# Patient Record
Sex: MALE | Race: BLACK OR AFRICAN AMERICAN | Employment: OTHER | URBAN - METROPOLITAN AREA
[De-identification: names, ages, dates, MRNs, and addresses within clinical notes are randomized per-mention and may not be internally consistent; named-entity substitution may affect disease eponyms.]

---

## 2017-01-01 ENCOUNTER — OFFICE VISIT (OUTPATIENT)
Dept: SLEEP MEDICINE | Age: 62
End: 2017-01-01

## 2017-01-01 ENCOUNTER — DOCUMENTATION ONLY (OUTPATIENT)
Dept: SLEEP MEDICINE | Age: 62
End: 2017-01-01

## 2017-01-01 ENCOUNTER — TELEPHONE (OUTPATIENT)
Dept: INTERNAL MEDICINE CLINIC | Age: 62
End: 2017-01-01

## 2017-01-01 ENCOUNTER — OFFICE VISIT (OUTPATIENT)
Dept: INTERNAL MEDICINE CLINIC | Age: 62
End: 2017-01-01

## 2017-01-01 ENCOUNTER — OFFICE VISIT (OUTPATIENT)
Dept: ENDOCRINOLOGY | Age: 62
End: 2017-01-01

## 2017-01-01 ENCOUNTER — TELEPHONE (OUTPATIENT)
Dept: ENDOCRINOLOGY | Age: 62
End: 2017-01-01

## 2017-01-01 VITALS
BODY MASS INDEX: 37.37 KG/M2 | TEMPERATURE: 98.6 F | OXYGEN SATURATION: 96 % | DIASTOLIC BLOOD PRESSURE: 70 MMHG | SYSTOLIC BLOOD PRESSURE: 147 MMHG | HEART RATE: 74 BPM | HEIGHT: 70 IN | WEIGHT: 261 LBS

## 2017-01-01 VITALS
OXYGEN SATURATION: 93 % | WEIGHT: 258.6 LBS | RESPIRATION RATE: 16 BRPM | HEART RATE: 83 BPM | HEIGHT: 70 IN | SYSTOLIC BLOOD PRESSURE: 165 MMHG | DIASTOLIC BLOOD PRESSURE: 81 MMHG | TEMPERATURE: 98.1 F | BODY MASS INDEX: 37.02 KG/M2

## 2017-01-01 VITALS
DIASTOLIC BLOOD PRESSURE: 66 MMHG | HEART RATE: 81 BPM | WEIGHT: 262 LBS | SYSTOLIC BLOOD PRESSURE: 133 MMHG | BODY MASS INDEX: 37.51 KG/M2 | HEIGHT: 70 IN

## 2017-01-01 VITALS
DIASTOLIC BLOOD PRESSURE: 71 MMHG | HEART RATE: 81 BPM | BODY MASS INDEX: 37.37 KG/M2 | WEIGHT: 261 LBS | SYSTOLIC BLOOD PRESSURE: 159 MMHG | HEIGHT: 70 IN

## 2017-01-01 DIAGNOSIS — E78.5 DYSLIPIDEMIA: ICD-10-CM

## 2017-01-01 DIAGNOSIS — I10 HTN (HYPERTENSION), BENIGN: ICD-10-CM

## 2017-01-01 DIAGNOSIS — Z79.4 TYPE 2 DIABETES MELLITUS WITH COMPLICATION, WITH LONG-TERM CURRENT USE OF INSULIN (HCC): Primary | ICD-10-CM

## 2017-01-01 DIAGNOSIS — N18.30 CHRONIC KIDNEY DISEASE, STAGE III (MODERATE) (HCC): ICD-10-CM

## 2017-01-01 DIAGNOSIS — E66.9 OBESITY (BMI 30-39.9): ICD-10-CM

## 2017-01-01 DIAGNOSIS — E11.8 TYPE 2 DIABETES MELLITUS WITH COMPLICATION, WITH LONG-TERM CURRENT USE OF INSULIN (HCC): Primary | ICD-10-CM

## 2017-01-01 DIAGNOSIS — I67.9 CEREBRAL VASCULAR DISEASE: ICD-10-CM

## 2017-01-01 DIAGNOSIS — I10 ESSENTIAL HYPERTENSION: Primary | ICD-10-CM

## 2017-01-01 DIAGNOSIS — E78.2 MIXED HYPERLIPIDEMIA: ICD-10-CM

## 2017-01-01 DIAGNOSIS — E11.9 DIABETES MELLITUS WITHOUT COMPLICATION (HCC): Primary | ICD-10-CM

## 2017-01-01 DIAGNOSIS — N18.4 CKD (CHRONIC KIDNEY DISEASE) STAGE 4, GFR 15-29 ML/MIN (HCC): ICD-10-CM

## 2017-01-01 DIAGNOSIS — G47.33 OSA (OBSTRUCTIVE SLEEP APNEA): Primary | ICD-10-CM

## 2017-01-01 RX ORDER — ATORVASTATIN CALCIUM 20 MG/1
TABLET, FILM COATED ORAL
Qty: 90 TAB | Refills: 3 | Status: SHIPPED | OUTPATIENT
Start: 2017-01-01

## 2017-01-01 RX ORDER — GLUCOSAM/CHONDRO/HERB 149/HYAL 750-100 MG
TABLET ORAL
COMMUNITY

## 2017-01-01 RX ORDER — CHLORTHALIDONE 25 MG/1
25 TABLET ORAL DAILY
Qty: 90 TAB | Refills: 3 | Status: SHIPPED | OUTPATIENT
Start: 2017-01-01

## 2017-01-01 RX ORDER — ACETAMINOPHEN AND CODEINE PHOSPHATE 300; 30 MG/1; MG/1
TABLET ORAL
Refills: 0 | COMMUNITY
Start: 2017-01-01

## 2017-01-01 RX ORDER — DOXAZOSIN 8 MG/1
TABLET ORAL DAILY
Refills: 4 | COMMUNITY
Start: 2017-01-01

## 2017-01-01 RX ORDER — LISINOPRIL 40 MG/1
40 TABLET ORAL DAILY
Qty: 90 TAB | Refills: 3 | Status: SHIPPED | OUTPATIENT
Start: 2017-01-01

## 2017-01-01 RX ORDER — CARVEDILOL 25 MG/1
25 TABLET ORAL 2 TIMES DAILY WITH MEALS
Qty: 180 TAB | Refills: 3 | Status: SHIPPED | OUTPATIENT
Start: 2017-01-01

## 2017-01-01 RX ORDER — CLINDAMYCIN HYDROCHLORIDE 300 MG/1
CAPSULE ORAL
Refills: 0 | COMMUNITY
Start: 2017-01-01

## 2017-01-01 RX ORDER — TORSEMIDE 20 MG/1
20 TABLET ORAL DAILY
Qty: 90 TAB | Refills: 3 | Status: SHIPPED | OUTPATIENT
Start: 2017-01-01

## 2017-01-01 RX ORDER — AMLODIPINE BESYLATE 10 MG/1
TABLET ORAL
Qty: 90 TAB | Refills: 2 | Status: SHIPPED | OUTPATIENT
Start: 2017-01-01

## 2017-01-01 RX ORDER — ASPIRIN AND DIPYRIDAMOLE 25; 200 MG/1; MG/1
CAPSULE, EXTENDED RELEASE ORAL
Qty: 180 CAP | Refills: 1 | Status: SHIPPED | OUTPATIENT
Start: 2017-01-01

## 2017-01-17 NOTE — MR AVS SNAPSHOT
Visit Information Date & Time Provider Department Dept. Phone Encounter #  
 1/17/2017  1:50 PM Zenaida Thibodeaux, [address] [facility] Diabetes and Endocrinology [phone] Follow-up Instructions Return in about 3 months (around 4/17/2017). Your Appointments 1/24/2017  2:45 PM  
ROUTINE CARE with MD AMARI Broussard [facility]) Appt Note: 4 month follow up; rcm,1/6/17 SC; pt r/s from 1/9/17 (inclement weather) [address] [phone]  
[address] [facility] [address] [place] [address] Upcoming Health Maintenance Date Due  
 MEDICARE YEARLY EXAM 12/8/1974 EYE EXAM RETINAL OR DILATED Q1 12/1/2015 INFLUENZA AGE 9 TO ADULT 8/1/2016 Pneumococcal 19-64 Highest Risk (2 of 3 - PPSV23) 11/3/2016 ZOSTER VACCINE AGE 60> 12/8/2016 FOOT EXAM Q1 2/22/2017 HEMOGLOBIN A1C Q6M 3/9/2017 LIPID PANEL Q1 11/30/2017 COLONOSCOPY 3/20/2022 DTaP/Tdap/Td series (3 - Td) 11/11/2025 Allergies as of 1/17/2017  Review Complete On: 1/17/2017 By: Zenaida Thibodeaux MD  
  
 Severity Noted Reaction Type Reactions Augmentin [Amoxicillin-pot Clavulanate]  07/08/2013    Hives Penicillins  02/21/2014    Rash Current Immunizations  Never Reviewed Name Date Influenza Vaccine 9/11/2015 Influenza Vaccine PF 9/12/2014 10:55 AM  
 Influenza Vaccine Split 10/26/2012 Pneumococcal Conjugate (PCV-13) 9/8/2016 TDAP Vaccine 10/26/2012 Tdap 11/11/2015 Not reviewed this visit You Were Diagnosed With   
  
 Codes Comments Diabetes mellitus without complication ([facility]     MGO-18-OU: E11.9 ICD-9-CM: 250.00 Vitals BP Pulse Height(growth percentile) Weight(growth percentile) BMI Smoking Status 133/66 81 5' 10\" (1.778 m) 262 lb (118.8 kg) 37.59 kg/m2 Current Every Day Smoker Vitals History BMI and BSA Data Body Mass Index Body Surface Area  
 37.59 kg/m 2 2.42 m 2 Preferred Pharmacy Pharmacy Name Phone [facility] [address] [place], [address] [phone] Your Updated Medication List  
  
   
This list is accurate as of: 1/17/17  2:41 PM.  Always use your most recent med list.  
  
  
  
  
 albuterol 90 mcg/actuation inhaler Commonly known as:  PROVENTIL HFA, VENTOLIN HFA, PROAIR HFA Take 2 Puffs by inhalation every four (4) hours as needed for Wheezing. amLODIPine 10 mg tablet Commonly known as:  Blanca Fraction Take 1 Tab by mouth daily. aspirin-dipyridamole  mg per SR capsule Commonly known as:  AGGRENOX Take 1 Cap by mouth two (2) times a day. atorvastatin 20 mg tablet Commonly known as:  LIPITOR  
TAKE 1 TABLET BY MOUTH DAILY  
  
 BD INSULIN SYRINGE ULTRA-FINE 1 mL 31 gauge x 5/16 Syrg Generic drug:  Insulin Syringe-Needle U-100 TO USE AS DIRECTED TWICE A DAY  
  
 carvedilol 25 mg tablet Commonly known as:  Maria Elena Spry Take 1 Tab by mouth two (2) times daily (with meals). chlorthalidone 25 mg tablet Commonly known as:  Suzen Pay Take 1 Tab by mouth daily. clotrimazole-betamethasone topical cream  
Commonly known as:  Hope Lipa Apply  to affected area two (2) times a day. cyclobenzaprine 10 mg tablet Commonly known as:  FLEXERIL Take 1 Tab by mouth three (3) times daily as needed for Muscle Spasm(s). doxazosin 4 mg tablet Commonly known as:  CARDURA Take 1 Tab by mouth nightly. For blood pressure  
  
 insulin NPH/insulin regular 100 unit/mL (70-30) injection Commonly known as:  HumuLIN 70/30 INJECT 65 UNITS SUBCUTANEOUSLY WITH BREAKFAST, 45 UNITS WITH LUNCH, 65 UNITS WITH DINNER + ADDITIONAL FOR HIGH GLUCOSES- UP  UNITS/DAY lisinopril 40 mg tablet Commonly known as:  Lam Paradise Take 1 Tab by mouth daily. multivitamin tablet Commonly known as:  ONE A DAY Take 1 Tab by mouth daily. Kajal Pen Needle 32 gauge x 5/32\" Ndle Generic drug:  Insulin Needles (Disposable) USE TO INJECT DAILY  
  
 ONETOUCH ULTRA TEST strip Generic drug:  glucose blood VI test strips USE TO TEST BLOOD SUGAR THREE TIMES A DAY AS DIRECTED  
  
 torsemide 20 mg tablet Commonly known as:  DEMADEX Take 1 Tab by mouth daily. For blood pressure and fluid VITAMIN B-12 500 mcg tablet Generic drug:  cyanocobalamin Take 500 mcg by mouth daily. Prescriptions Sent to Pharmacy Refills  
 insulin NPH/insulin regular (HUMULIN 70/30) 100 unit/mL (70-30) injection 10 Sig: INJECT 65 UNITS SUBCUTANEOUSLY WITH BREAKFAST, 45 UNITS WITH LUNCH, 65 UNITS WITH DINNER + ADDITIONAL FOR HIGH GLUCOSES- UP  UNITS/DAY Class: Normal  
 Pharmacy: [address] [place] [address], [address]  #: [phone] Follow-up Instructions Return in about 3 months (around 4/17/2017). Patient Instructions Diabetes Avoid added sugars from you drink Decrease intake of bread, pasta, rice, potatoes ** Before you snack, check glucose. Novolog 70/30 
65 units with breakfast , lunch: 45 units, PM dose  65 units **Adjusting Novolog 70/30 for glucoses < 100 - take 10 units less 201-250: add 5 units 251-300: add 10 units 301-350 :add 15 units 351 +    : add 20 units Check blood sugars before meals and at bedtime Goals for blood sugars: 
Fasting  (less than 150) Other times -  (less than 180). Blood pressure: 
Continue chlorthalidone 25 mg, lisinopril 40 mg, amlodipine to 5 mg daily, carvedilol to 25 mg twice daily, torsemide, doxazosin 8 mg. Cholesterol:  
Continue atorvastatin 40 mg Introducing [facility]! Dear Bhavani Alegre: Thank you for requesting a [employer] account.   Our records indicate that you have previously registered for a [employer] account but its currently inactive. Please call our [employer] support line at [phone]. Additional Information If you have questions, please visit the Frequently Asked Questions section of the [employer] website at [email or web address] Remember, [employer] is NOT to be used for urgent needs. For medical emergencies, dial 911. Now available from your iPhone and Android! Please provide this summary of care documentation to your next provider. Your primary care clinician is listed as [place]. If you have any questions after today's visit, please call [phone].

## 2017-01-17 NOTE — PROGRESS NOTES
History of Present Illness: Binta Jenkins is a 62 y.o. male presents for follow-up of diabetes. He has had diabetes for over 20 years. He also has HTN, dyslipidemia, PAD (s/p bypass in right leg, stent in lower leg in 2016) and CKD  Since last visit he has started seeing Lisbeth Kirkpatrick for primary care and was referred to nephrology    Diabetes related complications:  + retinopathy - severe, + nephropathy - 2-4 g+ protein, + neuropathy     Current diabetes regimen:   Novolog 70/30 -  50, 35, 55  with breakfast, lunch and supper. Additional added for hyperglycemia. 5:50 for values > 201    Glucoses:  Brings log. Checking before meals. Glucoses are largely in 200s and 300s. No recent lows. Had 1-2 values in 80s-90s. Diet:   Eating out often - may have snacks (chips,oreos, soda) at home buying class. For meals - fish sandwich or other fast food restaurants. - reports snacking before lunch and dinner if he feels 'funny' and suspects his glucose may be low    HTN: taking amlodipine 10 mg, carvedilol 25 mg BID , chlorthalidone 25, lisinopril 40, torsemide, doxazosin 8 mg.      Dyslipidemia - atorvastatin 20 mg    URBAN -using machine    Past Medical History   Diagnosis Date    Chest pain 2011     Negative stress test by Dr. Gabrielle Cunningham Chronic kidney disease (CKD), stage III (moderate)     Cortical senile cataract 10/26/2012    Degenerative disc disease, cervical 10/26/2012    Diabetes ([address] 1993     type II    Diabetic macular edema(362.07) 10/26/2012    Diabetic retinopathy of right eye ([address]      Dr. 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 Ginna Arboleda  Proteinuria 10/26/2012    Severe nonproliferative diabetic retinopathy(362.06) 10/26/2012    Shingles 2011    Stroke [facility]) 2004     left sided weakness    Unspecified sleep apnea      On cpap; Follows with Dr. Callie Tran     Current Outpatient Prescriptions   Medication Sig    amLODIPine (NORVASC) 10 mg tablet Take 1 Tab by mouth daily.  aspirin-dipyridamole (AGGRENOX)  mg per SR capsule Take 1 Cap by mouth two (2) times a day.  doxazosin (CARDURA) 4 mg tablet Take 1 Tab by mouth nightly. For blood pressure    torsemide (DEMADEX) 20 mg tablet Take 1 Tab by mouth daily. For blood pressure and fluid    albuterol (PROVENTIL HFA, VENTOLIN HFA, PROAIR HFA) 90 mcg/actuation inhaler Take 2 Puffs by inhalation every four (4) hours as needed for Wheezing.  cyclobenzaprine (FLEXERIL) 10 mg tablet Take 1 Tab by mouth three (3) times daily as needed for Muscle Spasm(s).  atorvastatin (LIPITOR) 20 mg tablet TAKE 1 TABLET BY MOUTH DAILY    BD INSULIN SYRINGE ULTRA-FINE 1 mL 31 gauge x 5/16 syrg TO USE AS DIRECTED TWICE A DAY    insulin NPH/insulin regular (HUMULIN 70/30) 100 unit/mL (70-30) injection INJECT 60 UNITS SUBCUTANEOUSLY WITH BREAKFAST, 40 UNITS WITH LUNCH AND 50 UNITS WITH SUPPER (Patient taking differently: INJECT 55 UNITS SUBCUTANEOUSLY WITH BREAKFAST, 35 UNITS WITH LUNCH AND 55 UNITS WITH SUPPER)    carvedilol (COREG) 25 mg tablet Take 1 Tab by mouth two (2) times daily (with meals).  chlorthalidone (HYGROTEN) 25 mg tablet Take 1 Tab by mouth daily.  lisinopril (PRINIVIL, ZESTRIL) 40 mg tablet Take 1 Tab by mouth daily.  TATI PEN NEEDLE 32 gauge x 5/32\" ndle USE TO INJECT DAILY    clotrimazole-betamethasone (LOTRISONE) topical cream Apply  to affected area two (2) times a day.  ONE TOUCH ULTRA TEST strip USE TO TEST BLOOD SUGAR THREE TIMES A DAY AS DIRECTED    multivitamin (ONE A DAY) tablet Take 1 Tab by mouth daily.     cyanocobalamin (VITAMIN B-12) 500 mcg tablet Take 500 mcg by mouth daily. No current facility-administered medications for this visit. Allergies   Allergen Reactions    Augmentin [Amoxicillin-Pot Clavulanate] Hives    Penicillins Rash       Review of Systems:  - Eyes: no blurry vision or double vision  - Cardiovascular: no chest pain  - Respiratory: no shortness of breath  - Musculoskeletal: no myalgias  - Neurological: stable    Physical Examination:  Visit Vitals    /66    Pulse 81    Ht 5' 10\" (1.778 m)    Wt 262 lb (118.8 kg)    BMI 37.59 kg/m2   -   - General: pleasant, no distress, normal gait   HEENT: hearing intact, EOMI, clear sclera without icterus  - Cardiovascular: regular, normal rate   - Respiratory: normal effort  - Integumentary: no edema  - Psychiatric: normal mood and affect    Data Reviewed:     Component      Latest Ref Rng & Units 9/9/2016 9/9/2016 9/9/2016           8:48 AM  8:48 AM  8:48 AM   Glucose      65 - 99 mg/dL   190 (H)   BUN      6 - 24 mg/dL   34 (H)   Creatinine      0.76 - 1.27 mg/dL   2.72 (H)   GFR est non-AA      >59 mL/min/1.73   24 (L)   GFR est AA      >59 mL/min/1.73   28 (L)   BUN/Creatinine ratio      9 - 20   13   Sodium      134 - 144 mmol/L   141   Potassium      3.5 - 5.2 mmol/L   4.7   Chloride      97 - 108 mmol/L   103   CO2      18 - 29 mmol/L   20   Calcium      8.7 - 10.2 mg/dL   8.6 (L)   Protein, total      6.0 - 8.5 g/dL   6.1   Albumin      3.5 - 5.5 g/dL   3.4 (L)   GLOBULIN, TOTAL      1.5 - 4.5 g/dL   2.7   A-G Ratio      1.1 - 2.5   1.3   Bilirubin, total      0.0 - 1.2 mg/dL   0.2   Alk.  phosphatase      39 - 117 IU/L   101   AST      0 - 40 IU/L   14   ALT      0 - 44 IU/L   15   Cholesterol, total      100 - 199 mg/dL  135    Triglyceride      0 - 149 mg/dL  200 (H)    HDL Cholesterol      >39 mg/dL  30 (L)    VLDL, calculated      5 - 40 mg/dL  40    LDL, calculated      0 - 99 mg/dL  65    Hemoglobin A1c, (calculated)      4.8 - 5.6 % 11.2 (H)     Estimated average glucose mg/dL 275       Assessment/Plan:   1. Diabetes mellitus without complication ([address]   - needs more insulin and needs to make dietary changes as well. See below. - Increase each dose by 10 units -> 65/45/65 units with meals. Continue adding 5:50 for highs.   - he is snacking between meals if he feels 'funny'. Recommend he check glucoses in these instances so we can see glucose and see how you respond to treatment. Glucose in office was 317 when he felt 'funny' and was going to eat. 2. HTN (hypertension), benign   Continue chlorthalidone 25 mg, lisinopril 40 mg, amlodipine to 5 mg daily, carvedilol to 25 mg twice daily, torsemide, doxazosin 8 mg. 3. CKD (chronic kidney disease) stage 4, GFR 15-29 ml/min ([facility])   - reviewed importance of BP and glucose control to avoid further progression of kidney function decline.   - needs to make some dietary improvements. Recommended he increase vegetable intake and decrease carb and sweet intake. Greater than 50% of 25 minute visit was spent counseling the patient about above. Patient Instructions   Diabetes  Avoid added sugars from you drink  Decrease intake of bread, pasta, rice, potatoes  ** Before you snack, check glucose. Novolog 70/30  65 units with breakfast , lunch: 45 units, PM dose  65 units    **Adjusting Novolog 70/30 for glucoses    < 100 - take 10 units less  201-250: add 5 units  251-300: add 10 units  301-350 :add 15 units  351 +    : add 20 units    Check blood sugars before meals and at bedtime    Goals for blood sugars:  Fasting  (less than 150)  Other times -  (less than 180). Blood pressure:  Continue chlorthalidone 25 mg, lisinopril 40 mg, amlodipine to 5 mg daily, carvedilol to 25 mg twice daily, torsemide, doxazosin 8 mg. Cholesterol:   Continue atorvastatin 40 mg      Follow-up Disposition:  Return in about 3 months (around 4/17/2017).     Copy sent to:  lexi

## 2017-01-17 NOTE — PATIENT INSTRUCTIONS
Diabetes  Avoid added sugars from you drink  Decrease intake of bread, pasta, rice, potatoes  ** Before you snack, check glucose. Novolog 70/30  65 units with breakfast , lunch: 45 units, PM dose  65 units    **Adjusting Novolog 70/30 for glucoses    < 100 - take 10 units less  201-250: add 5 units  251-300: add 10 units  301-350 :add 15 units  351 +    : add 20 units    Check blood sugars before meals and at bedtime    Goals for blood sugars:  Fasting  (less than 150)  Other times -  (less than 180). Blood pressure:  Continue chlorthalidone 25 mg, lisinopril 40 mg, amlodipine to 5 mg daily, carvedilol to 25 mg twice daily, torsemide, doxazosin 8 mg.       Cholesterol:   Continue atorvastatin 40 mg

## 2017-01-24 NOTE — PATIENT INSTRUCTIONS
Learning About Healthy Weight  What is a healthy weight? A healthy weight is the weight at which you feel good about yourself and have energy for work and play. It's also one that lowers your risk for health problems. What can you do to stay at a healthy weight? It can be hard to stay at a healthy weight, especially when fast food, vending-machine snacks, and processed foods are so easy to find. And with your busy lifestyle, activity may be low on your list of things to do. But staying at a healthy weight may be easier than you think. Here are some dos and don'ts for staying at a healthy weight:  Do eat healthy foods  The kinds of foods you eat have a big impact on both your weight and your health. Reaching and staying at a healthy weight is not about going on a diet. It's about making healthier food choices every day and changing your diet for good. Healthy eating means eating a variety of foods so that you get all the nutrients you need. Your body needs protein, carbohydrate, and fats for energy. They keep your heart beating, your brain active, and your muscles working. On most days, try to eat from each food group. This means eating a variety of:  · Whole grains, such as whole wheat breads and pastas. · Fruits and vegetables. · Dairy products, such as low-fat milk, yogurt, and cheese. · Lean proteins, such as all types of fish, chicken without the skin, and beans. Don't have too much or too little of one thing. All foods, if eaten in moderation, can be part of healthy eating. Even sweets can be okay. If your favorite foods are high in fat, salt, sugar, or calories, limit how often you eat them. Eat smaller servings, or look for healthy substitutes. Do watch what you eat  Many people eat more than their bodies need. Part of staying at a healthy weight means learning how much food you really need from day to day and not eating more than that.  Even with healthy foods, eating too much can make you gain weight. Having a well-balanced diet means that you eat enough, but not too much, and that your food gives you the nutrients you need to stay healthy. So listen to your body. Eat when you're hungry. Stop when you feel satisfied. It's a good idea to have healthy snacks ready for when you get hungry. Keep healthy snacks with you at work, in your car, and at home. If you have a healthy snack easily available, you'll be less likely to pick a candy bar or bag of chips from a vending machine instead. Some healthy snacks you might want to keep on hand are fruit, low-fat yogurt, string cheese, low-fat microwave popcorn, raisins and other dried fruit, nuts, whole wheat crackers, pretzels, carrots, celery sticks, and broccoli. Do some physical activity  A big part of reaching and staying at a healthy weight is being active. When you're active, you burn calories. This makes it easier to reach and stay at a healthy weight. When you're active on a regular basis, your body burns more calories, even when you're at rest. Being active helps you lose fat and build lean muscle. Try to be active for at least 1 hour every day. This may sound like a lot, but it's okay to be active in smaller blocks of time that add up to 1 hour a day. Any activity that makes your heart beat faster and keeps it there for a while counts. A brisk walk, run, or swim will get your heart beating faster. So will climbing stairs, shooting baskets, or cycling. Even some household chores like vacuuming and mowing the lawn will get your heart rate up. Pick activities that you enjoyones that make your heart beat faster, your muscles stronger, and your muscles and joints more flexible. If you find more than one thing you like doing, do them all. You don't have to do the same thing every day. Don't diet  Diets don't work. Diets are temporary. Because you give up so much when you diet, you may be hungry and think about food all the time.  And after you stop dieting, you also may overeat to make up for what you missed. Most people who diet end up gaining back the pounds they lostand more. Remember that healthy bodies come in lots of shapes and sizes. Everyone can get healthier by eating better and being more active. Where can you learn more? Go to http://nuha-marshall.info/. Enter 704 1981 in the search box to learn more about \"Learning About Healthy Weight. \"  Current as of: February 16, 2016  Content Version: 11.1  © 4390-6849 [employer]. Care instructions adapted under license by [employer] (which disclaims liability or warranty for this information). If you have questions about a medical condition or this instruction, always ask your healthcare professional. [address] any warranty or liability for your use of this information.

## 2017-01-24 NOTE — MR AVS SNAPSHOT
Visit Information Date & Time Provider Department Dept. Phone Encounter #  
 1/24/2017  2:45 PM Prabhu Thibodeaux, [address] [place] [address],[address] [phone] [id number] Follow-up Instructions Return in about 6 months (around 7/24/2017) for HTN, etc.  
 Follow-up and Disposition History Your Appointments 4/4/2017  2:10 PM  
ROUTINE CARE with No Zabala MD  
[facility]) Appt Note: f/u diabetes cp0.00  
 [address] [place] [address]  
[address] [address] [address] [address] Upcoming Health Maintenance Date Due  
 MEDICARE YEARLY EXAM 12/8/1974 EYE EXAM RETINAL OR DILATED Q1 12/1/2015 Pneumococcal 19-64 Highest Risk (2 of 3 - PPSV23) 11/3/2016 ZOSTER VACCINE AGE 60> 12/8/2016 HEMOGLOBIN A1C Q6M 3/9/2017 LIPID PANEL Q1 11/30/2017 FOOT EXAM Q1 12/13/2017 COLONOSCOPY 3/20/2022 DTaP/Tdap/Td series (3 - Td) 11/11/2025 Allergies as of 1/24/2017  Review Complete On: 1/24/2017 By: Prabhu Thibodeaux MD  
  
 Severity Noted Reaction Type Reactions Augmentin [Amoxicillin-pot Clavulanate]  07/08/2013    Hives Penicillins  02/21/2014    Rash Current Immunizations  Reviewed on 1/24/2017 Name Date Influenza Vaccine 11/15/2016, 9/11/2015 Influenza Vaccine PF 9/12/2014 10:55 AM  
 Influenza Vaccine Split 10/26/2012 Pneumococcal Conjugate (PCV-13) 9/8/2016 TDAP Vaccine 10/26/2012 Tdap 11/11/2015 Reviewed by Prabhu Thibodeaux MD on 1/24/2017 at  3:27 PM  
You Were Diagnosed With   
  
 Codes Comments Essential hypertension    -  Primary ICD-10-CM: I10 
ICD-9-CM: 401.9 Mixed hyperlipidemia     ICD-10-CM: E78.2 ICD-9-CM: 272.2 Cerebral vascular disease     ICD-10-CM: I67.9 ICD-9-CM: 437.9 Chronic kidney disease, stage III (moderate)     ICD-10-CM: N18.3 ICD-9-CM: 445. 3 Vitals BP Pulse Temp Resp Height(growth percentile) Weight(growth percentile) 165/81 (BP 1 Location: Left arm, BP Patient Position: Sitting) 83 98.1 °F (36.7 °C) (Oral) 16 5' 10\" (1.778 m) 258 lb 9.6 oz (117.3 kg) SpO2 BMI Smoking Status 93% 37.11 kg/m2 Current Every Day Smoker Vitals History BMI and BSA Data Body Mass Index Body Surface Area  
 37.11 kg/m 2 2.41 m 2 Preferred Pharmacy Pharmacy Name Phone [id number], [address] [phone] Your Updated Medication List  
  
   
This list is accurate as of: 1/24/17  3:36 PM.  Always use your most recent med list.  
  
  
  
  
 albuterol 90 mcg/actuation inhaler Commonly known as:  PROVENTIL HFA, VENTOLIN HFA, PROAIR HFA Take 2 Puffs by inhalation every four (4) hours as needed for Wheezing. amLODIPine 10 mg tablet Commonly known as:  Sangeeta Pace Take 1 Tab by mouth daily. aspirin-dipyridamole  mg per SR capsule Commonly known as:  AGGRENOX Take 1 Cap by mouth two (2) times a day. atorvastatin 20 mg tablet Commonly known as:  LIPITOR  
TAKE 1 TABLET BY MOUTH DAILY  
  
 BD INSULIN SYRINGE ULTRA-FINE 1 mL 31 gauge x 5/16 Syrg Generic drug:  Insulin Syringe-Needle U-100 TO USE AS DIRECTED TWICE A DAY  
  
 carvedilol 25 mg tablet Commonly known as:  Phineas Hoar Take 1 Tab by mouth two (2) times daily (with meals). chlorthalidone 25 mg tablet Commonly known as:  Teagan Juniper Take 1 Tab by mouth daily. clotrimazole-betamethasone topical cream  
Commonly known as:  Daniel Leaf Apply  to affected area two (2) times a day. cyclobenzaprine 10 mg tablet Commonly known as:  FLEXERIL Take 1 Tab by mouth three (3) times daily as needed for Muscle Spasm(s). doxazosin 4 mg tablet Commonly known as:  CARDURA Take 1 Tab by mouth nightly. For blood pressure insulin NPH/insulin regular 100 unit/mL (70-30) injection Commonly known as:  HumuLIN 70/30 INJECT 65 UNITS SUBCUTANEOUSLY WITH BREAKFAST, 45 UNITS WITH LUNCH, 65 UNITS WITH DINNER + ADDITIONAL FOR HIGH GLUCOSES- UP  UNITS/DAY lisinopril 40 mg tablet Commonly known as:  Delon Hu Take 1 Tab by mouth daily. multivitamin tablet Commonly known as:  ONE A DAY Take 1 Tab by mouth daily. Kajal Pen Needle 32 gauge x 5/32\" Ndle Generic drug:  Insulin Needles (Disposable) USE TO INJECT DAILY  
  
 ONETOUCH ULTRA TEST strip Generic drug:  glucose blood VI test strips USE TO TEST BLOOD SUGAR THREE TIMES A DAY AS DIRECTED  
  
 torsemide 20 mg tablet Commonly known as:  DEMADEX Take 1 Tab by mouth daily. For blood pressure and fluid VITAMIN B-12 500 mcg tablet Generic drug:  cyanocobalamin Take 500 mcg by mouth daily. Prescriptions Sent to Pharmacy Refills  
 carvedilol (COREG) 25 mg tablet 3 Sig: Take 1 Tab by mouth two (2) times daily (with meals). Class: Normal  
 Pharmacy: [address] [place] [address], [address] Ph #: [phone] Route: Oral  
 chlorthalidone (HYGROTEN) 25 mg tablet 3 Sig: Take 1 Tab by mouth daily. Class: Normal  
 Pharmacy: [address] [place] [address], [address] Ph #: [phone] Route: Oral  
 lisinopril (PRINIVIL, ZESTRIL) 40 mg tablet 3 Sig: Take 1 Tab by mouth daily. Class: Normal  
 Pharmacy: [address] [place] [address], [address] Ph #: [phone] Route: Oral  
 torsemide (DEMADEX) 20 mg tablet 3 Sig: Take 1 Tab by mouth daily. For blood pressure and fluid Class: Normal  
 Pharmacy: [address] [place] [address], [address] Ph #: [phone] Route: Oral  
 atorvastatin (LIPITOR) 20 mg tablet 3 Sig: TAKE 1 TABLET BY MOUTH DAILY Class: Normal  
 Pharmacy: [address] [place] [address], [address]  #: [phone] Follow-up Instructions Return in about 6 months (around 7/24/2017) for HTN, etc.  
  
  
Patient Instructions Learning About Healthy Weight What is a healthy weight? A healthy weight is the weight at which you feel good about yourself and have energy for work and play. It's also one that lowers your risk for health problems. What can you do to stay at a healthy weight? It can be hard to stay at a healthy weight, especially when fast food, vending-machine snacks, and processed foods are so easy to find. And with your busy lifestyle, activity may be low on your list of things to do. But staying at a healthy weight may be easier than you think. Here are some dos and don'ts for staying at a healthy weight: 
Do eat healthy foods The kinds of foods you eat have a big impact on both your weight and your health. Reaching and staying at a healthy weight is not about going on a diet. It's about making healthier food choices every day and changing your diet for good. Healthy eating means eating a variety of foods so that you get all the nutrients you need. Your body needs protein, carbohydrate, and fats for energy. They keep your heart beating, your brain active, and your muscles working. On most days, try to eat from each food group. This means eating a variety of: · Whole grains, such as whole wheat breads and pastas. · Fruits and vegetables. · Dairy products, such as low-fat milk, yogurt, and cheese. · Lean proteins, such as all types of fish, chicken without the skin, and beans. Don't have too much or too little of one thing. All foods, if eaten in moderation, can be part of healthy eating. Even sweets can be okay. If your favorite foods are high in fat, salt, sugar, or calories, limit how often you eat them. Eat smaller servings, or look for healthy substitutes. Do watch what you eat Many people eat more than their bodies need. Part of staying at a healthy weight means learning how much food you really need from day to day and not eating more than that. Even with healthy foods, eating too much can make you gain weight. Having a well-balanced diet means that you eat enough, but not too much, and that your food gives you the nutrients you need to stay healthy. So listen to your body. Eat when you're hungry. Stop when you feel satisfied. It's a good idea to have healthy snacks ready for when you get hungry. Keep healthy snacks with you at work, in your car, and at home. If you have a healthy snack easily available, you'll be less likely to pick a candy bar or bag of chips from a vending machine instead. Some healthy snacks you might want to keep on hand are fruit, low-fat yogurt, string cheese, low-fat microwave popcorn, raisins and other dried fruit, nuts, whole wheat crackers, pretzels, carrots, celery sticks, and broccoli. Do some physical activity A big part of reaching and staying at a healthy weight is being active. When you're active, you burn calories. This makes it easier to reach and stay at a healthy weight. When you're active on a regular basis, your body burns more calories, even when you're at rest. Being active helps you lose fat and build lean muscle. Try to be active for at least 1 hour every day. This may sound like a lot, but it's okay to be active in smaller blocks of time that add up to 1 hour a day. Any activity that makes your heart beat faster and keeps it there for a while counts. A brisk walk, run, or swim will get your heart beating faster. So will climbing stairs, shooting baskets, or cycling. Even some household chores like vacuuming and mowing the lawn will get your heart rate up. Pick activities that you enjoyones that make your heart beat faster, your muscles stronger, and your muscles and joints more flexible. If you find more than one thing you like doing, do them all. You don't have to do the same thing every day. Don't diet Diets don't work. Diets are temporary. Because you give up so much when you diet, you may be hungry and think about food all the time. And after you stop dieting, you also may overeat to make up for what you missed. Most people who diet end up gaining back the pounds they lostand more. Remember that healthy bodies come in lots of shapes and sizes. Everyone can get healthier by eating better and being more active. Where can you learn more? Go to http://nuha-marshall.info/. Enter 684 [id number] in the search box to learn more about \"Learning About Healthy Weight. \" Current as of: February 16, 2016 Content Version: 11.1 © 9906-8175 [employer]. Care instructions adapted under license by [employer] (which disclaims liability or warranty for this information). If you have questions about a medical condition or this instruction, always ask your healthcare professional. [place] [address] any warranty or liability for your use of this information. Introducing \[facility] [place] [facility]\"" & HEALTH SERVICES! Dear Taye Bass: Thank you for requesting a [employer] account. Our records indicate that you have previously registered for a [employer] account but its currently inactive. Please call our [employer] support line at [phone]. Additional Information If you have questions, please visit the Frequently Asked Questions section of the [employer] website at [email or web address] Remember, [employer] is NOT to be used for urgent needs. For medical emergencies, dial 911. Now available from your iPhone and Android! Please provide this summary of care documentation to your next provider. Your primary care clinician is listed as [place]. If you have any questions after today's visit, please call [phone].

## 2017-01-24 NOTE — PROGRESS NOTES
1. Have you been to the ER, urgent care clinic since your last visit? Hospitalized since your last visit?no    2. Have you seen or consulted any other health care providers outside of the [address] [place] [address] since your last visit? Include any pap smears or colon screening.  no

## 2017-01-24 NOTE — PROGRESS NOTES
SUBJECTIVE  Mr. Ho Flannery presents today acutely for regular follow up. Chief Complaint   Patient presents with    Diabetes    Follow-up     4 month f.u       He had front upper teeth removed yesterday. Now in pain and has trouble eating. R knee gives out sometimes. Low back pain \"comes and goes. I did that therapy and it went away for a minute; it flared back up. \"   He went through surgery Oct 2015, lumbar laminectomy, and subsequently had a lot of PT. His rash on hands is \"pretty much cleared up. \" Sees Derm and given cream.   He is seeing Dr. Angel Marte for his diabetes. Abdominal hernia: We noted in 2015 that Dr. Colonel Alcocer saw him, and for now doesn't feel that surgery is the best option. At this time, he is otherwise doing well and has brought no other complaints to my attention today. For a list of the medical issues addressed today, see the assessment and plan below. PMH:   Past Medical History   Diagnosis Date    Chest pain 2011     Negative stress test by Dr. Candy Young Chronic kidney disease (CKD), stage III (moderate)     Cortical senile cataract 10/26/2012    Degenerative disc disease, cervical 10/26/2012    Diabetes ([address] 1993     type II    Diabetic macular edema(362.07) 10/26/2012    Diabetic retinopathy of right eye ([address]      Dr. 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 Gretel Simons Proteinuria 10/26/2012    Severe nonproliferative diabetic retinopathy(362.06) 10/26/2012    Shingles 2011    Stroke [facility]) 2004     left sided weakness    Unspecified sleep apnea      On cpap;  Follows with Dr. Patel Magana     PSH:   Past Surgical History   Procedure Laterality Date    Vascular surgery procedure unlist  11/07     bypass r leg; Dr. Jg Wei Hx gi  4/16/12     LAPAROSCOPIC TO OPEN COLON SIGMOID COLECTOMY WITH SPLENIC FLEXURE MOBILIZATION/ RESECTION JEJUNUM WITH COLOJEJUNAL FISTULAS WITH END TO END ANASTAMOSIS/COLOPROCTOSTOMY  / CYSTOSCOPY W URETERAL STENT   ; CYSTOSCOPY INSERTION URETERAL CATHETERS    Hx colonoscopy  March 2012     S[facility] Diesel    Hx orthopaedic       patella replaced right leg    Hx orthopaedic       arthroscopic knee left    Hx orthopaedic  10/2015     back surg    Hx orthopaedic       stent in R leg       All: He is allergic to augmentin [amoxicillin-pot clavulanate] and penicillins. MEDS:   Current Outpatient Prescriptions   Medication Sig    insulin NPH/insulin regular (HUMULIN 70/30) 100 unit/mL (70-30) injection INJECT 65 UNITS SUBCUTANEOUSLY WITH BREAKFAST, 45 UNITS WITH LUNCH, 65 UNITS WITH DINNER + ADDITIONAL FOR HIGH GLUCOSES- UP  UNITS/DAY    amLODIPine (NORVASC) 10 mg tablet Take 1 Tab by mouth daily.  aspirin-dipyridamole (AGGRENOX)  mg per SR capsule Take 1 Cap by mouth two (2) times a day.  doxazosin (CARDURA) 4 mg tablet Take 1 Tab by mouth nightly. For blood pressure    torsemide (DEMADEX) 20 mg tablet Take 1 Tab by mouth daily. For blood pressure and fluid    albuterol (PROVENTIL HFA, VENTOLIN HFA, PROAIR HFA) 90 mcg/actuation inhaler Take 2 Puffs by inhalation every four (4) hours as needed for Wheezing.  cyclobenzaprine (FLEXERIL) 10 mg tablet Take 1 Tab by mouth three (3) times daily as needed for Muscle Spasm(s).  atorvastatin (LIPITOR) 20 mg tablet TAKE 1 TABLET BY MOUTH DAILY    BD INSULIN SYRINGE ULTRA-FINE 1 mL 31 gauge x 5/16 syrg TO USE AS DIRECTED TWICE A DAY    carvedilol (COREG) 25 mg tablet Take 1 Tab by mouth two (2) times daily (with meals).  chlorthalidone (HYGROTEN) 25 mg tablet Take 1 Tab by mouth daily.     lisinopril (PRINIVIL, ZESTRIL) 40 mg tablet Take 1 Tab by mouth daily.  TATI PEN NEEDLE 32 gauge x \" ndle USE TO INJECT DAILY    clotrimazole-betamethasone (LOTRISONE) topical cream Apply  to affected area two (2) times a day.  ONE TOUCH ULTRA TEST strip USE TO TEST BLOOD SUGAR THREE TIMES A DAY AS DIRECTED    multivitamin (ONE A DAY) tablet Take 1 Tab by mouth daily.  cyanocobalamin (VITAMIN B-12) 500 mcg tablet Take 500 mcg by mouth daily. No current facility-administered medications for this visit. FH: Father  young, possibly from untreated DM: \"he used to have bloody socks, went to the hospital and never came out. \"  Mother had DM, HTN,  age 80 of ESRD. One brother is living, and has HTN, DM. A brother  lung cancer, and had DM. Another brother  of MI, and had DM. Another  brother had DM. SH: He is retired on disability from work for [employer], as . He reports that he has been smoking. He has a 40.00 pack-year smoking history. He has never used smokeless tobacco. He reports that he drinks alcohol. He reports that he does not use illicit drugs. ROS: See above; Complete ROS otherwise negative. OBJECTIVE:   Vitals:   Visit Vitals    /81 (BP 1 Location: Left arm, BP Patient Position: Sitting)    Pulse 83    Temp 98.1 °F (36.7 °C) (Oral)    Resp 16    Ht 5' 10\" (1.778 m)    Wt 258 lb 9.6 oz (117.3 kg)    SpO2 93%    BMI 37.11 kg/m2     Gen: Pleasant 62 y.o. AA male in NAD. Ambulates with cane. HEENT: PERRLA. EOMI. OP moist and pink. Neck: Supple. No LAD. HEART: RRR, No M/G/R.    LUNGS: CTAB No W/R. ABDOMEN: S, NT, ND, BS+. There is a 3-4 cm area of swelling present in lower mid abdomen, at surgical scar, present with valsalva and standing. EXTREMITIES: Warm. No C/C/E.  MUSCULOSKELETAL: Normal ROM, muscle strength 5/5 all groups. NEURO: Alert and oriented x 3. Cranial nerves grossly intact.   No focal sensory or motor deficits noted. SKIN: Warm. Dry. Petechial rash bilateral hands on palms. Lab Results   Component Value Date/Time    Sodium 141 09/09/2016 08:48 AM    Potassium 4.7 09/09/2016 08:48 AM    Chloride 103 09/09/2016 08:48 AM    CO2 20 09/09/2016 08:48 AM    Anion gap 7 05/01/2012 06:00 AM    Glucose 190 09/09/2016 08:48 AM    BUN 34 09/09/2016 08:48 AM    Creatinine 2.72 09/09/2016 08:48 AM    BUN/Creatinine ratio 13 09/09/2016 08:48 AM    GFR est AA 28 09/09/2016 08:48 AM    GFR est non-AA 24 09/09/2016 08:48 AM    Calcium 8.6 09/09/2016 08:48 AM    ALT 15 09/09/2016 08:48 AM    AST 14 09/09/2016 08:48 AM    Alk. phosphatase 101 09/09/2016 08:48 AM    Protein, total 6.1 09/09/2016 08:48 AM    Albumin 3.4 09/09/2016 08:48 AM    Globulin 4.3 04/25/2012 02:55 AM    A-G Ratio 1.3 09/09/2016 08:48 AM       Lab Results   Component Value Date/Time    WBC 9.4 09/09/2016 08:48 AM    HGB 10.9 09/09/2016 08:48 AM    HCT 34.4 09/09/2016 08:48 AM    PLATELET 946 31/86/4168 08:48 AM    MCV 93 09/09/2016 08:48 AM       ASSESSMENT/ PLAN:   1. R lateral low back pain: Likely muscular. Rx for flexeril to take as needed, in addition to tylenol. If doesn't improve, may need to resume PT or see his back specialist.   2. Hypertension: BP high today; Continue current medications except we'll increase the norvasc to 10 mg.  3. Rash hands: recurred. Has spots on arms now as well. Ref derm. 4. Ventral wall hernia. For now can watch this. Has been to Dr. Fannie Cabalelro, General surgery to discuss options. 5. Hyperlipidemia: Ok at last check. Seeing Endocrine now. - lovastatin (MEVACOR) 20 mg tablet; Take 1 Tab by mouth nightly. 6. Diabetes mellitus: On insulin. Not controlled at last check. Now seeing Dr. Pratibha Molina. 7. Obstructive sleep apnea on cpap: Follow up as per Dr. Cintron Card has been a while. 8. Chronic kidney disease, stage III (moderate): Baseline Cr about 1.6. Recheck. 9. Peripheral vascular disease: Follow up with Dr. Leonel Mcduffie. 10. Cerebral vascular disease with L hemiplegia: Residual L weakness, imbalance. Ambulates with cane. - dipyridamole-aspirin (AGGRENOX) 200-25 mg per SR capsule; Take 1 Cap by mouth two (2) times a day. 11. Osteoarthritis, multiple sites: Currently stable. Knee pain is severe at times. Ambulates with cane. 12. Proteinuria  13. Diverticulosis of colon (without mention of hemorrhage):   14. Degenerative disc disease, cervical: Stable pain. 15. Severe nonproliferative diabetic retinopathy / Diabetic macular edema / Hypertensive retinopathy / Cortical senile cataract: As per Dr. Venita Weems.      Follow-up Disposition:  Return in about 6 months (around 7/24/2017) for HTN, etc.

## 2017-03-02 NOTE — PROGRESS NOTES
[address] [facility] [place], [address]  Tel.  [phone]  Fax. 100 [facility] 60  [place], [address] [facility]  Tel.  [phone]  Fax. [phone] [address]   Tel.  [phone]  Fax. [phone]       Subjective:      Natali Sapp is a 62 y.o. male who I am asked to see in consultation for evaluation and management of sleep apnea. He was diagnosed with sleep apnea several years ago. He is using his device regularly. He complains of awakening in the middle of the night because of mask air leaking associated with tossing and turning. Symptoms began a few weeks ago, unchanged since that time. He usually can fall asleep in 5 minutes. Family or house members note tossing and turning. He denies snoring, choking. There are minimal  mask comfort problems. The following problems are noted with PAP:     Drowsiness no Problems exhaling no   Snoring no Forget to put on no   Mask Comfortable yes Can't fall asleep no   Dry Mouth no Mask falls off no   Air Leaking yes Frequent awakenings no     Natlai Sapp does not wake up frequently at night. He is not bothered by waking up too early and left unable to get back to sleep. He actually sleeps about 6 hours at night and wakes up about 3 (between 1-3) times during the night. He does not work shifts: Joseene Means Gillermina Gaucher indicates he does not get too little sleep at night. His bedtime is 2300. He awakens at 0700. He does take naps. He takes 3 naps a week lasting 2, Hour(s). He has the following observed behaviors:  ;  .  Other remarks:      [place] Sleepiness Score: 13   which reflect moderate daytime drowsiness.     Allergies   Allergen Reactions    Augmentin [Amoxicillin-Pot Clavulanate] Hives    Penicillins Rash         Current Outpatient Prescriptions:     LIRAGLUTIDE (VICTOZA 2-UNIQUE SC), by SubCUTAneous route., Disp: , Rfl:     Omega-3-DHA-EPA-Fish Oil (FISH OIL) 1,000 mg (120 mg-180 mg) cap, Take  by mouth., Disp: , Rfl:     carvedilol (COREG) 25 mg tablet, Take 1 Tab by mouth two (2) times daily (with meals). , Disp: 180 Tab, Rfl: 3    chlorthalidone (HYGROTEN) 25 mg tablet, Take 1 Tab by mouth daily. , Disp: 90 Tab, Rfl: 3    lisinopril (PRINIVIL, ZESTRIL) 40 mg tablet, Take 1 Tab by mouth daily. , Disp: 90 Tab, Rfl: 3    torsemide (DEMADEX) 20 mg tablet, Take 1 Tab by mouth daily. For blood pressure and fluid, Disp: 90 Tab, Rfl: 3    atorvastatin (LIPITOR) 20 mg tablet, TAKE 1 TABLET BY MOUTH DAILY, Disp: 90 Tab, Rfl: 3    insulin NPH/insulin regular (HUMULIN 70/30) 100 unit/mL (70-30) injection, INJECT 65 UNITS SUBCUTANEOUSLY WITH BREAKFAST, 45 UNITS WITH LUNCH, 65 UNITS WITH DINNER + ADDITIONAL FOR HIGH GLUCOSES- UP  UNITS/DAY, Disp: 50 mL, Rfl: 10    amLODIPine (NORVASC) 10 mg tablet, Take 1 Tab by mouth daily. , Disp: 90 Tab, Rfl: 3    aspirin-dipyridamole (AGGRENOX)  mg per SR capsule, Take 1 Cap by mouth two (2) times a day., Disp: 180 Cap, Rfl: 3    doxazosin (CARDURA) 4 mg tablet, Take 1 Tab by mouth nightly. For blood pressure, Disp: 90 Tab, Rfl: 3    albuterol (PROVENTIL HFA, VENTOLIN HFA, PROAIR HFA) 90 mcg/actuation inhaler, Take 2 Puffs by inhalation every four (4) hours as needed for Wheezing., Disp: 1 Inhaler, Rfl: 5    cyclobenzaprine (FLEXERIL) 10 mg tablet, Take 1 Tab by mouth three (3) times daily as needed for Muscle Spasm(s). , Disp: 30 Tab, Rfl: 1    BD INSULIN SYRINGE ULTRA-FINE 1 mL 31 gauge x 5/16 syrg, TO USE AS DIRECTED TWICE A DAY, Disp: 100 Syringe, Rfl: 3    TATI PEN NEEDLE 32 gauge x 5/32\" ndle, USE TO INJECT DAILY, Disp: 100 Pen Needle, Rfl: 3    clotrimazole-betamethasone (LOTRISONE) topical cream, Apply  to affected area two (2) times a day., Disp: 45 g, Rfl: 1    ONE TOUCH ULTRA TEST strip, USE TO TEST BLOOD SUGAR THREE TIMES A DAY AS DIRECTED, Disp: 100 Strip, Rfl: 6    multivitamin (ONE A DAY) tablet, Take 1 Tab by mouth daily. , Disp: , Rfl:   cyanocobalamin (VITAMIN B-12) 500 mcg tablet, Take 500 mcg by mouth daily. , Disp: , Rfl:      He  has a past medical history of Chest pain (2011); Chronic kidney disease (CKD), stage III (moderate); Cortical senile cataract (10/26/2012); Degenerative disc disease, cervical (10/26/2012); Diabetes ([facility] (1993); Diabetic macular edema(362.07) (10/26/2012); [facility] Diverticulosis of colon (without mention of hemorrhage) (10/26/2012); Generalized osteoarthrosis, involving multiple sites (10/26/2012); Hyperlipidemia; Hypertension; Hypertensive retinopathy (10/26/2012); Large bowel obstruction [facility]) (April 2012); Pacemaker; Peripheral arterial disease ([facility] Proteinuria (10/26/2012); Severe nonproliferative diabetic retinopathy(362.06) (10/26/2012); Shingles (2011); Stroke [facility]) (2004); and Unspecified sleep apnea. He  has a past surgical history that includes vascular surgery procedure unlist (11/07); gi (4/16/12); colonoscopy (March 2012); orthopaedic; orthopaedic; orthopaedic (10/2015); and orthopaedic. He family history includes Cancer (age of onset: 48) in his brother; Diabetes in his brother, brother, father, maternal aunt, and mother; Heart Disease in his brother; Hypertension in his father and mother; Kidney Disease in his mother. He  reports that he has been smoking. He has a 40.00 pack-year smoking history. He has never used smokeless tobacco. He reports that he drinks alcohol. He reports that he does not use illicit drugs. Review of Systems:  Constitutional:  No significant weight loss or weight gain. Eyes:  No blurred vision.   CVS:  No significant chest pain  Pulm:  No significant shortness of breath  GI:  No significant nausea or vomiting  :  No significant nocturia  Musculoskeletal:  No significant joint pain at night  Skin:  No significant rashes  Neuro:  No significant dizziness   Psych:  No active mood issues    Sleep Review of Systems: notable for no difficulty falling asleep; infrequent awakenings at night;  regular dreaming noted; no nightmares ; no early morning headaches ; no memory problems; no concentration issues; no history of any automobile or occupational accidents due to daytime drowsiness. Objective:     Visit Vitals    /70    Pulse 74    Temp 98.6 °F (37 °C)    Ht 5' 10\" (1.778 m)    Wt 261 lb (118.4 kg)    SpO2 96%    BMI 37.45 kg/m2         General:   Not in acute distress   Eyes:  Anicteric sclerae, no obvious strabismus   Nose:  No obvious nasal septum deviation    Oropharynx:   Class 3 oropharyngeal outlet, thick tongue base,  low-lying soft palate, narrow tonsilo-pharyngeal pilars   Tonsils:   tonsils are unappreciated   Neck:   Neck circ. in \"inches\": 18; midline trachea   Chest/Lungs:  Equal lung expansion, clear on auscultation    CVS:  Normal rate, regular rhythm; no JVD   Skin:  Warm to touch; no obvious rashes   Neuro:  No focal deficits ; no obvious tremor    Psych:  Normal affect,  normal countenance;          Assessment:       ICD-10-CM ICD-9-CM    1. URBAN (obstructive sleep apnea) G47.33 327.23 AMB SUPPLY ORDER   2. Obesity (BMI 30-39. 9) E66.9 278.00      AHI = ?. On CPAP :  10-20 cmH2O. Compliant:      yes    Therapeutic Response:  Positive  Plan:     * He was provided information on sleep apnea including coresponding risk factors and the importance of proper treatment. * He was likewise counseled on weight management and lateral sleeping posture (with the use of bed pillows or wedge) which may reduce sleep apnea events. Caution with hypnotics, alcohol, and sedating pain medications as these can worsen sleep apnea. * We've requested previous sleep records and studies.   Orders Placed This Encounter    AMB SUPPLY ORDER     PAP Mask -patient preference, tubing, filters as needed (all sleep supplies)  Length of Need = 99 months    Moustapha Gates M.D.  (electronically signed)  Diplomate in Sleep Medicine, [facility] Follow-up Disposition:  Return in about 1 year (around 3/2/2018), or if symptoms worsen or fail to improve. Counseling was provided regarding the importance of regular PAP use and on proper sleep hygiene and safe driving. I have reviewed/discussed the above normal BMI with the patient. I have recommended the following interventions: dietary management education, guidance, and counseling . The plan is as follows: I have counseled this patient on diet and exercise regimens. .    Thank you for allowing us to participate in your patient's medical care.     Toña Osuna M.D.  (electronically signed)  Diplomate in Sleep Medicine, [facility]

## 2017-03-02 NOTE — MR AVS SNAPSHOT
Visit Information Date & Time Provider Department Dept. Phone Encounter #  
 3/2/2017 11:00 AM Krishan HickmanHortencia 53 [id number] Follow-up Instructions Return in about 1 year (around 3/2/2018), or if symptoms worsen or fail to improve. Follow-up and Disposition History Your Appointments 4/4/2017  2:10 PM  
ROUTINE CARE with MD Adiel Cortezmond [facility]) Appt Note: f/u diabetes cp0.00  
 [address] [place] [address]  
[address] [address]  
  
    
 7/24/2017  2:45 PM  
ROUTINE CARE with Wesly Hopkins MD  
[facility]) Appt Note: 6 month follow up htn  
 [address] [place] [address] [id number]  
[address] [place] [address] [address] [place] [address] Upcoming Health Maintenance Date Due  
 MEDICARE YEARLY EXAM 12/8/1974 Pneumococcal 19-64 Highest Risk (2 of 3 - PPSV23) 11/3/2016 ZOSTER VACCINE AGE 60> 12/8/2016 HEMOGLOBIN A1C Q6M 3/9/2017 LIPID PANEL Q1 11/30/2017 FOOT EXAM Q1 12/13/2017 EYE EXAM RETINAL OR DILATED Q1 1/27/2018 COLONOSCOPY 3/20/2022 DTaP/Tdap/Td series (3 - Td) 11/11/2025 Allergies as of 3/2/2017  Review Complete On: 3/2/2017 By: Krishan Hickman MD  
  
 Severity Noted Reaction Type Reactions Augmentin [Amoxicillin-pot Clavulanate]  07/08/2013    Hives Penicillins  02/21/2014    Rash Current Immunizations  Reviewed on 1/24/2017 Name Date Influenza Vaccine 11/15/2016, 9/11/2015 Influenza Vaccine PF 9/12/2014 10:55 AM  
 Influenza Vaccine Split 10/26/2012 Pneumococcal Conjugate (PCV-13) 9/8/2016 TDAP Vaccine 10/26/2012 Tdap 11/11/2015 Not reviewed this visit You Were Diagnosed With   
  
 Codes Comments URBAN (obstructive sleep apnea)    -  Primary ICD-10-CM: G47.33 
ICD-9-CM: 327.23 Obesity (BMI 30-39. 9)     ICD-10-CM: E66.9 ICD-9-CM: 278.00 Vitals BP  
  
  
  
  
  
 147/70 Vitals History BMI and BSA Data Body Mass Index Body Surface Area  
 37.45 kg/m 2 2.42 m 2 Preferred Pharmacy Pharmacy Name Phone [id number], [address] [phone] Your Updated Medication List  
  
   
This list is accurate as of: 3/2/17 11:33 AM.  Always use your most recent med list.  
  
  
  
  
 albuterol 90 mcg/actuation inhaler Commonly known as:  PROVENTIL HFA, VENTOLIN HFA, PROAIR HFA Take 2 Puffs by inhalation every four (4) hours as needed for Wheezing. amLODIPine 10 mg tablet Commonly known as:  Senthil Alstrom Take 1 Tab by mouth daily. aspirin-dipyridamole  mg per SR capsule Commonly known as:  AGGRENOX Take 1 Cap by mouth two (2) times a day. atorvastatin 20 mg tablet Commonly known as:  LIPITOR  
TAKE 1 TABLET BY MOUTH DAILY  
  
 BD INSULIN SYRINGE ULTRA-FINE 1 mL 31 gauge x 5/16 Syrg Generic drug:  Insulin Syringe-Needle U-100 TO USE AS DIRECTED TWICE A DAY  
  
 carvedilol 25 mg tablet Commonly known as:  Jestine Pellet Take 1 Tab by mouth two (2) times daily (with meals). chlorthalidone 25 mg tablet Commonly known as:  Roetta Magic Take 1 Tab by mouth daily. clotrimazole-betamethasone topical cream  
Commonly known as:  Haig  Apply  to affected area two (2) times a day. cyclobenzaprine 10 mg tablet Commonly known as:  FLEXERIL Take 1 Tab by mouth three (3) times daily as needed for Muscle Spasm(s). doxazosin 4 mg tablet Commonly known as:  CARDURA Take 1 Tab by mouth nightly. For blood pressure FISH OIL 1,000 mg (120 mg-180 mg) Cap Generic drug:  Omega-3-DHA-EPA-Fish Oil Take  by mouth. insulin NPH/insulin regular 100 unit/mL (70-30) injection Commonly known as:  HumuLIN 70/30 INJECT 65 UNITS SUBCUTANEOUSLY WITH BREAKFAST, 45 UNITS WITH LUNCH, 65 UNITS WITH DINNER + ADDITIONAL FOR HIGH GLUCOSES- UP  UNITS/DAY lisinopril 40 mg tablet Commonly known as:  Ora Bee Take 1 Tab by mouth daily. multivitamin tablet Commonly known as:  ONE A DAY Take 1 Tab by mouth daily. Kajal Pen Needle 32 gauge x 5/32\" Ndle Generic drug:  Insulin Needles (Disposable) USE TO INJECT DAILY  
  
 ONETOUCH ULTRA TEST strip Generic drug:  glucose blood VI test strips USE TO TEST BLOOD SUGAR THREE TIMES A DAY AS DIRECTED  
  
 torsemide 20 mg tablet Commonly known as:  DEMADEX Take 1 Tab by mouth daily. For blood pressure and fluid VICTOZA 2-UNIQUE SC  
by SubCUTAneous route. VITAMIN B-12 500 mcg tablet Generic drug:  cyanocobalamin Take 500 mcg by mouth daily. We Performed the Following AMB SUPPLY ORDER [[id number] Custom] Comments:  
 PAP Mask -patient preference, tubing, filters as needed (all sleep supplies) Length of Need = 99 months Karime Lucas M.D.  (electronically signed) Diplomate in Sleep Medicine, [employer] Follow-up Instructions Return in about 1 year (around 3/2/2018), or if symptoms worsen or fail to improve. Patient Instructions [address] [facility] [address] [facility], [address] Tel.  [phone] Fax. 100 [facility] 60 [place], 200 [facility] Tel.  [phone] Fax. [phone] [address] [place] [address] Tel.  [phone] Fax. [phone] Learning About CPAP for Sleep Apnea What is CPAP? CPAP is a small machine that you use at home every night while you sleep. It increases air pressure in your throat to keep your airway open.  When you have sleep apnea, this can help you sleep better so you feel much better. CPAP stands for \"continuous positive airway pressure. \" The CPAP machine will have one of the following: · A mask that covers your nose and mouth · Prongs that fit into your nose · A mask that covers your nose only, the most common type. This type is called NCPAP. The N stands for \"nasal.\" Why is it done? CPAP is usually the best treatment for obstructive sleep apnea. It is the first treatment choice and the most widely used. Your doctor may suggest CPAP if you have: · Moderate to severe sleep apnea. · Sleep apnea and coronary artery disease (CAD) or heart failure. How does it help? · CPAP can help you have more normal sleep, so you feel less sleepy and more alert during the daytime. · CPAP may help keep heart failure or other heart problems from getting worse. · NCPAP may help lower your blood pressure. · If you use CPAP, your bed partner may also sleep better because you are not snoring or restless. What are the side effects? Some people who use CPAP have: · A dry or stuffy nose and a sore throat. · Irritated skin on the face. · Sore eyes. · Bloating. If you have any of these problems, work with your doctor to fix them. Here are some things you can try: · Be sure the mask or nasal prongs fit well. · See if your doctor can adjust the pressure of your CPAP. · If your nose is dry, try a humidifier. · If your nose is runny or stuffy, try decongestant medicine or a steroid nasal spray. If these things do not help, you might try a different type of machine. Some machines have air pressure that adjusts on its own. Others have air pressures that are different when you breathe in than when you breathe out. This may reduce discomfort caused by too much pressure in your nose. Where can you learn more? Go to STinser.be Enter B107 in the search box to learn more about \"Learning About CPAP for Sleep Apnea. \"  
 © 1796-5365 Healthwise, Incorporated. Care instructions adapted under license by [facility] (which disclaims liability or warranty for this information). This care instruction is for use with your licensed healthcare professional. If you have questions about a medical condition or this instruction, always ask your healthcare professional. [address] any warranty or liability for your use of this information. Content Version: 9.2.04758; Last Revised: January 11, 2010 PROPER SLEEP HYGIENE What to avoid · Do not have drinks with caffeine, such as coffee or black tea, for 8 hours before bed. · Do not smoke or use other types of tobacco near bedtime. Nicotine is a stimulant and can keep you awake. · Avoid drinking alcohol late in the evening, because it can cause you to wake in the middle of the night. · Do not eat a big meal close to bedtime. If you are hungry, eat a light snack. · Do not drink a lot of water close to bedtime, because the need to urinate may wake you up during the night. · Do not read or watch TV in bed. Use the bed only for sleeping and sexual activity. What to try · Go to bed at the same time every night, and wake up at the same time every morning. Do not take naps during the day. · Keep your bedroom quiet, dark, and cool. · Get regular exercise, but not within 3 to 4 hours of your bedtime. Danny Keller · Sleep on a comfortable pillow and mattress. · If watching the clock makes you anxious, turn it facing away from you so you cannot see the time. · If you worry when you lie down, start a worry book. Well before bedtime, write down your worries, and then set the book and your concerns aside. · Try meditation or other relaxation techniques before you go to bed. · If you cannot fall asleep, get up and go to another room until you feel sleepy. Do something relaxing. Repeat your bedtime routine before you go to bed again. · Make your house quiet and calm about an hour before bedtime. Turn down the lights, turn off the TV, log off the computer, and turn down the volume on music. This can help you relax after a busy day. Drowsy Driving: The [employer] cites drowsiness as a causing factor in more than 487,910 police reported crashes annually, resulting in 76,000 injuries and 1,500 deaths. Other surveys suggest 55% of people polled have driven while drowsy in the past year, 23% had fallen asleep but not crashed, 3% crashed, and 2% had and accident due to drowsy driving. Who is at risk? Young Drivers: One study of drowsy driving accidents states that 55% of the drivers were under 25 years. Of those, 75% were male. Shift Workers and Travelers: People who work overnight or travel across time zones frequently are at higher risk of experiencing Circadian Rhythm Disorders. They are trying to work and function when their body is programed to sleep. Sleep Deprived: Lack of sleep has a serious impact on your ability to pay attention or focus on a task. Consistently getting less than the average of 8 hours your body needs creates partial or cumulative sleep deprivation. Untreated Sleep Disorders: Sleep Apnea, Narcolepsy, R.L.S., and other sleep disorders (untreated) prevent a person from getting enough restful sleep. This leads to excessive daytime sleepiness and increases the risk for drowsy driving accidents by up to 7 times. Medications / Alcohol: Even over the counter medications can cause drowsiness. Medications that impair a drivers attention should have a warning label. Alcohol naturally makes you sleepy and on its own can cause accidents. Combined with excessive drowsiness its effects are amplified. Signs of Drowsy Driving: * You don't remember driving the last few miles * You may drift out of your maverick * You are unable to focus and your thoughts wander * You may yawn more often than normal 
 * You have difficulty keeping your eyes open / nodding off * Missing traffic signs, speeding, or tailgating Prevention-  
Good sleep hygiene, lifestyle and behavioral choices have the most impact on drowsy driving. There is no substitute for sleep and the average person requires 8 hours nightly. If you find yourself driving drowsy, stop and sleep. Consider the sleep hygiene tips provided during your visit as well. Medication Refill Policy: Refills for all medications require 1 week advance notice. Please have your pharmacy fax a refill request. We are unable to fax, or call in \"controled substance\" medications and you will need to pick these prescriptions up from our office. MathZee Activation Thank you for requesting access to MathZee. Please follow the instructions below to securely access and download your online medical record. MathZee allows you to send messages to your doctor, view your test results, renew your prescriptions, schedule appointments, and more. How Do I Sign Up? 1. In your internet browser, go to [email or web address] 2. Click on the First Time User? Click Here link in the Sign In box. You will see the New Member Sign Up page. 3. Enter your MathZee Access Code exactly as it appears below. You will not need to use this code after youve completed the sign-up process. If you do not sign up before the expiration date, you must request a new code. MathZee Access Code: [email or web address] Expires: 2017 11:29 AM (This is the date your MathZee access code will ) 4. Enter the last four digits of your Social Security Number (xxxx) and Date of Birth (mm/dd/yyyy) as indicated and click Submit. You will be taken to the next sign-up page. 5. Create a MathZee ID. This will be your MathZee login ID and cannot be changed, so think of one that is secure and easy to remember. 6. Create a SpineThera password. You can change your password at any time. 7. Enter your Password Reset Question and Answer. This can be used at a later time if you forget your password. 8. Enter your e-mail address. You will receive e-mail notification when new information is available in [address] 9. Click Sign Up. You can now view and download portions of your medical record. 10. Click the Download Summary menu link to download a portable copy of your medical information. Additional Information If you have questions, please call [phone]. Remember, SpineThera is NOT to be used for urgent needs. For medical emergencies, dial 911. Starting a Weight Loss Plan: After Your Visit Your Care Instructions If you are thinking about losing weight, it can be hard to know where to start. Your doctor can help you set up a weight loss plan that best meets your needs. You may want to take a class on nutrition or exercise, or join a weight loss support group. If you have questions about how to make changes to your eating or exercise habits, ask your doctor about seeing a registered dietitian or an exercise specialist. 
It can be a big challenge to lose weight. But you do not have to make huge changes at once. Make small changes, and stick with them. When those changes become habit, add a few more changes. If you do not think you are ready to make changes right now, try to pick a date in the future. Make an appointment to see your doctor to discuss whether the time is right for you to start a plan. Follow-up care is a key part of your treatment and safety. Be sure to make and go to all appointments, and call your doctor if you are having problems. It's also a good idea to know your test results and keep a list of the medicines you take. How can you care for yourself at home? · Set realistic goals.  Many people expect to lose much more weight than is likely. A weight loss of 5% to 10% of your body weight may be enough to improve your health. · Get family and friends involved to provide support. Talk to them about why you are trying to lose weight, and ask them to help. They can help by participating in exercise and having meals with you, even if they may be eating something different. · Find what works best for you. If you do not have time or do not like to cook, a program that offers meal replacement bars or shakes may be better for you. Or if you like to prepare meals, finding a plan that includes daily menus and recipes may be best. 
· Ask your doctor about other health professionals who can help you achieve your weight loss goals. ¨ A dietitian can help you make healthy changes in your diet. ¨ An exercise specialist or  can help you develop a safe and effective exercise program. 
¨ A counselor or psychiatrist can help you cope with issues such as depression, anxiety, or family problems that can make it hard to focus on weight loss. · Consider joining a support group for people who are trying to lose weight. Your doctor can suggest groups in your area. Where can you learn more? Go to e-[employer]otine Technologies.be Enter J319 in the search box to learn more about \"Starting a Weight Loss Plan: After Your Visit. \"  
© 3053-1923 Healthwise, Incorporated. Care instructions adapted under license by Annette Russ (which disclaims liability or warranty for this information). This care instruction is for use with your licensed healthcare professional. If you have questions about a medical condition or this instruction, always ask your healthcare professional. [place] [address] any warranty or liability for your use of this information. Content Version: 1.1.31258; Last Revised: September 1, 2009 Introducing [facility]! Pilar Young introduces Flukle patient portal. Now you can access parts of your medical record, email your doctor's office, and request medication refills online. 1. In your internet browser, go to [email or web address]/[email or web address] 2. Click on the First Time User? Click Here link in the Sign In box. You will see the New Member Sign Up page. 3. Enter your Flukle Access Code exactly as it appears below. You will not need to use this code after youve completed the sign-up process. If you do not sign up before the expiration date, you must request a new code. · Flukle Access Code: [email or web address] Expires: 5/31/2017 11:29 AM 
 
4. Enter the last four digits of your Social Security Number (xxxx) and Date of Birth (mm/dd/yyyy) as indicated and click Submit. You will be taken to the next sign-up page. 5. Create a Flukle ID. This will be your Flukle login ID and cannot be changed, so think of one that is secure and easy to remember. 6. Create a Flukle password. You can change your password at any time. 7. Enter your Password Reset Question and Answer. This can be used at a later time if you forget your password. 8. Enter your e-mail address. You will receive e-mail notification when new information is available in [address] 9. Click Sign Up. You can now view and download portions of your medical record. 10. Click the Download Summary menu link to download a portable copy of your medical information. If you have questions, please visit the Frequently Asked Questions section of the Flukle website. Remember, Flukle is NOT to be used for urgent needs. For medical emergencies, dial 911. Now available from your iPhone and Android! Please provide this summary of care documentation to your next provider. Your primary care clinician is listed as [place]. If you have any questions after today's visit, please call [phone].

## 2017-03-02 NOTE — PATIENT INSTRUCTIONS
[address] [place], [address]  Tel.  [phone]  Fax. 100 [facility] 60  [place], [address] [facility]  Tel.  [phone]  Fax. [phone] [facility]  [place]  Tel.  [phone]  Fax. [phone]     Learning About CPAP for Sleep Apnea  What is CPAP? CPAP is a small machine that you use at home every night while you sleep. It increases air pressure in your throat to keep your airway open. When you have sleep apnea, this can help you sleep better so you feel much better. CPAP stands for \"continuous positive airway pressure. \"  The CPAP machine will have one of the following:  · A mask that covers your nose and mouth  · Prongs that fit into your nose  · A mask that covers your nose only, the most common type. This type is called NCPAP. The N stands for \"nasal.\"  Why is it done? CPAP is usually the best treatment for obstructive sleep apnea. It is the first treatment choice and the most widely used. Your doctor may suggest CPAP if you have:  · Moderate to severe sleep apnea. · Sleep apnea and coronary artery disease (CAD) or heart failure. How does it help? · CPAP can help you have more normal sleep, so you feel less sleepy and more alert during the daytime. · CPAP may help keep heart failure or other heart problems from getting worse. · NCPAP may help lower your blood pressure. · If you use CPAP, your bed partner may also sleep better because you are not snoring or restless. What are the side effects? Some people who use CPAP have:  · A dry or stuffy nose and a sore throat. · Irritated skin on the face. · Sore eyes. · Bloating. If you have any of these problems, work with your doctor to fix them. Here are some things you can try:  · Be sure the mask or nasal prongs fit well. · See if your doctor can adjust the pressure of your CPAP. · If your nose is dry, try a humidifier.   · If your nose is runny or stuffy, try decongestant medicine or a steroid nasal spray. If these things do not help, you might try a different type of machine. Some machines have air pressure that adjusts on its own. Others have air pressures that are different when you breathe in than when you breathe out. This may reduce discomfort caused by too much pressure in your nose. Where can you learn more? Go to [employer]  Enter Farhad Paulino in the search box to learn more about \"Learning About CPAP for Sleep Apnea. \"   © 7424-6702 Healthwise, [employer]. Care instructions adapted under license by Wayne Jacobson (which disclaims liability or warranty for this information). This care instruction is for use with your licensed healthcare professional. If you have questions about a medical condition or this instruction, always ask your healthcare professional. [address] any warranty or liability for your use of this information. Content Version: 0.5.80411; Last Revised: January 11, 2010  PROPER SLEEP HYGIENE    What to avoid  · Do not have drinks with caffeine, such as coffee or black tea, for 8 hours before bed. · Do not smoke or use other types of tobacco near bedtime. Nicotine is a stimulant and can keep you awake. · Avoid drinking alcohol late in the evening, because it can cause you to wake in the middle of the night. · Do not eat a big meal close to bedtime. If you are hungry, eat a light snack. · Do not drink a lot of water close to bedtime, because the need to urinate may wake you up during the night. · Do not read or watch TV in bed. Use the bed only for sleeping and sexual activity. What to try  · Go to bed at the same time every night, and wake up at the same time every morning. Do not take naps during the day. · Keep your bedroom quiet, dark, and cool. · Get regular exercise, but not within 3 to 4 hours of your bedtime. .  · Sleep on a comfortable pillow and mattress.   · If watching the clock makes you anxious, turn it facing away from you so you cannot see the time. · If you worry when you lie down, start a worry book. Well before bedtime, write down your worries, and then set the book and your concerns aside. · Try meditation or other relaxation techniques before you go to bed. · If you cannot fall asleep, get up and go to another room until you feel sleepy. Do something relaxing. Repeat your bedtime routine before you go to bed again. · Make your house quiet and calm about an hour before bedtime. Turn down the lights, turn off the TV, log off the computer, and turn down the volume on music. This can help you relax after a busy day. Drowsy Driving: The Count includes the [facility] 54 cites drowsiness as a causing factor in more than 266,957 police reported crashes annually, resulting in 76,000 injuries and 1,500 deaths. Other surveys suggest 55% of people polled have driven while drowsy in the past year, 23% had fallen asleep but not crashed, 3% crashed, and 2% had and accident due to drowsy driving. Who is at risk? Young Drivers: One study of drowsy driving accidents states that 55% of the drivers were under 25 years. Of those, 75% were male. Shift Workers and Travelers: People who work overnight or travel across time zones frequently are at higher risk of experiencing Circadian Rhythm Disorders. They are trying to work and function when their body is programed to sleep. Sleep Deprived: Lack of sleep has a serious impact on your ability to pay attention or focus on a task. Consistently getting less than the average of 8 hours your body needs creates partial or cumulative sleep deprivation. Untreated Sleep Disorders: Sleep Apnea, Narcolepsy, R.L.S., and other sleep disorders (untreated) prevent a person from getting enough restful sleep. This leads to excessive daytime sleepiness and increases the risk for drowsy driving accidents by up to 7 times.   Medications / Alcohol: Even over the counter medications can cause drowsiness. Medications that impair a drivers attention should have a warning label. Alcohol naturally makes you sleepy and on its own can cause accidents. Combined with excessive drowsiness its effects are amplified. Signs of Drowsy Driving:   * You don't remember driving the last few miles   * You may drift out of your maverick   * You are unable to focus and your thoughts wander   * You may yawn more often than normal   * You have difficulty keeping your eyes open / nodding off   * Missing traffic signs, speeding, or tailgating  Prevention-   Good sleep hygiene, lifestyle and behavioral choices have the most impact on drowsy driving. There is no substitute for sleep and the average person requires 8 hours nightly. If you find yourself driving drowsy, stop and sleep. Consider the sleep hygiene tips provided during your visit as well. Medication Refill Policy: Refills for all medications require 1 week advance notice. Please have your pharmacy fax a refill request. We are unable to fax, or call in \"controled substance\" medications and you will need to pick these prescriptions up from our office. E-Blink Activation    Thank you for requesting access to E-Blink. Please follow the instructions below to securely access and download your online medical record. E-Blink allows you to send messages to your doctor, view your test results, renew your prescriptions, schedule appointments, and more. How Do I Sign Up? 1. In your internet browser, go to [email or web address]. 2. Click on the First Time User? Click Here link in the Sign In box. You will see the New Member Sign Up page. 3. Enter your E-Blink Access Code exactly as it appears below. You will not need to use this code after youve completed the sign-up process. If you do not sign up before the expiration date, you must request a new code.     E-Blink Access Code: [email or web address]  Expires: 2017 11:29 AM (This is the date your [facility]Now access code will )    4. Enter the last four digits of your Social Security Number (xxxx) and Date of Birth (mm/dd/yyyy) as indicated and click Submit. You will be taken to the next sign-up page. 5. Create a LabNow ID. This will be your LabNow login ID and cannot be changed, so think of one that is secure and easy to remember. 6. Create a LabNow password. You can change your password at any time. 7. Enter your Password Reset Question and Answer. This can be used at a later time if you forget your password. 8. Enter your e-mail address. You will receive e-mail notification when new information is available in [address] 9. Click Sign Up. You can now view and download portions of your medical record. 10. Click the Download Summary menu link to download a portable copy of your medical information. Additional Information    If you have questions, please call [phone]. Remember, LabNow is NOT to be used for urgent needs. For medical emergencies, dial 911. Starting a Weight Loss Plan: After Your Visit  Your Care Instructions  If you are thinking about losing weight, it can be hard to know where to start. Your doctor can help you set up a weight loss plan that best meets your needs. You may want to take a class on nutrition or exercise, or join a weight loss support group. If you have questions about how to make changes to your eating or exercise habits, ask your doctor about seeing a registered dietitian or an exercise specialist.  It can be a big challenge to lose weight. But you do not have to make huge changes at once. Make small changes, and stick with them. When those changes become habit, add a few more changes. If you do not think you are ready to make changes right now, try to pick a date in the future.  Make an appointment to see your doctor to discuss whether the time is right for you to start a plan.  Follow-up care is a key part of your treatment and safety. Be sure to make and go to all appointments, and call your doctor if you are having problems. It's also a good idea to know your test results and keep a list of the medicines you take. How can you care for yourself at home? · Set realistic goals. Many people expect to lose much more weight than is likely. A weight loss of 5% to 10% of your body weight may be enough to improve your health. · Get family and friends involved to provide support. Talk to them about why you are trying to lose weight, and ask them to help. They can help by participating in exercise and having meals with you, even if they may be eating something different. · Find what works best for you. If you do not have time or do not like to cook, a program that offers meal replacement bars or shakes may be better for you. Or if you like to prepare meals, finding a plan that includes daily menus and recipes may be best.  · Ask your doctor about other health professionals who can help you achieve your weight loss goals. ¨ A dietitian can help you make healthy changes in your diet. ¨ An exercise specialist or  can help you develop a safe and effective exercise program.  ¨ A counselor or psychiatrist can help you cope with issues such as depression, anxiety, or family problems that can make it hard to focus on weight loss. · Consider joining a support group for people who are trying to lose weight. Your doctor can suggest groups in your area. Where can you learn more? Go to [employer].be  Enter U357 in the search box to learn more about \"Starting a Weight Loss Plan: After Your Visit. \"   © 0595-4360 Healthwise, Inc[employer]. Care instructions adapted under license by Carrie Boyle (which disclaims liability or warranty for this information).  This care instruction is for use with your licensed healthcare professional. If you have questions about a medical condition or this instruction, always ask your healthcare professional. [place] [address] any warranty or liability for your use of this information.   Content Version: 4.0.69518; Last Revised: September 1, 2009

## 2017-03-17 NOTE — TELEPHONE ENCOUNTER
----- Message from Star Marquez sent at 3/16/2017  4:48 PM EDT -----  Regarding: Dr. Askew Nations refill  Pt is requesting a refill on \" novolin 70/30 insulin\" to be sent to the [facility] on file. Best contact number [phone].

## 2017-03-17 NOTE — TELEPHONE ENCOUNTER
----- Message from Priyanka Harris sent at 3/16/2017  4:48 PM EDT -----  Regarding: Dr. Zachariah Perry refill  Pt is requesting a refill on \" novolin 70/30 insulin\" to be sent to the [facility]  on file. Best contact number [phone].

## 2017-04-04 NOTE — PATIENT INSTRUCTIONS
Diabetes  Focus on what you are eating and drinking. Novolog 70/30  70 units with breakfast , lunch: 40 units, PM dose  65 units    **Adjusting Novolog 70/30 for glucoses    < 100 - take 10 units less  201-250: add 5 units  251-300: add 10 units  301-350 :add 15 units  351 +    : add 20 units    Check blood sugars before meals and at bedtime    Goals for blood sugars:  Fasting  (less than 150)  Other times -  (less than 180). Blood pressure:  - follow with kidney specialist  Continue chlorthalidone 25 mg, lisinopril 40 mg, amlodipine to 5 mg daily, carvedilol to 25 mg twice daily, torsemide, doxazosin 8 mg.       Cholesterol:   Continue atorvastatin 40 mg

## 2017-04-04 NOTE — PROGRESS NOTES
History of Present Illness: Jessica Scruggs is a 62 y.o. male presents for follow-up of diabetes. He has had diabetes for over 20 years. He also has HTN, dyslipidemia, PAD (s/p bypass in right leg, stent in lower leg in 2016) and CKD    a1c 10.5 with research study. Diabetes related complications:  + retinopathy - severe, + nephropathy - 2-4 g+ protein, + neuropathy     Current diabetes regimen:   Novolog 70/30 -   65, 45, 65 units with breakfast, lunch and supper. Additional added for hyperglycemia. 5:50 for values > 201    Glucoses:  Brings log. Majority of values are in 200s and 300s. Has had problems with dental infection. Has been taking abx. To have root canal    values were in 128-177 largely with few values higher at lunch and lowest AC dinner of 51 for about a week earlier this month. Diet:   Feels diet is affected by frequent doctor visits. Often eating out. HTN: taking amlodipine 10 mg, carvedilol 25 mg BID , chlorthalidone 25, lisinopril 40, torsemide, doxazosin 8 mg.      Dyslipidemia - atorvastatin 20 mg    URBAN -using machine    Past Medical History:   Diagnosis Date    Chest pain 2011    Negative stress test by Dr. Sandra Acharya Chronic kidney disease (CKD), stage III (moderate)     Cortical senile cataract 10/26/2012    Degenerative disc disease, cervical 10/26/2012    Diabetes ([address] 1993    type II    Diabetic macular edema ([address] 10/26/2012    Diabetic retinopathy of right eye ([address]     Dr. 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 Kiana Carolina Proteinuria 10/26/2012    Severe nonproliferative diabetic retinopathy ([address] 10/26/2012    Shingles 2011    Stroke [facility]) 2004    left sided weakness    Unspecified sleep apnea     On cpap; Follows with Dr. Millie Faust     Current Outpatient Prescriptions   Medication Sig    insulin NPH/insulin regular (NOVOLIN 70/30, HUMULIN 70/30) 100 unit/mL (70-30) injection INJECT 65 UNITS SUBCUTANEOUSLY WITH BREAKFAST, 45 UNITS WITH LUNCH, 65 UNITS WITH DINNER + ADDITIONAL FOR HIGH GLUCOSES- UP  UNITS/DAY    LIRAGLUTIDE (VICTOZA 2-UNIQUE SC) by SubCUTAneous route.  Omega-3-DHA-EPA-Fish Oil (FISH OIL) 1,000 mg (120 mg-180 mg) cap Take  by mouth.  carvedilol (COREG) 25 mg tablet Take 1 Tab by mouth two (2) times daily (with meals).  chlorthalidone (HYGROTEN) 25 mg tablet Take 1 Tab by mouth daily.  lisinopril (PRINIVIL, ZESTRIL) 40 mg tablet Take 1 Tab by mouth daily.  torsemide (DEMADEX) 20 mg tablet Take 1 Tab by mouth daily. For blood pressure and fluid    atorvastatin (LIPITOR) 20 mg tablet TAKE 1 TABLET BY MOUTH DAILY    amLODIPine (NORVASC) 10 mg tablet Take 1 Tab by mouth daily.  aspirin-dipyridamole (AGGRENOX)  mg per SR capsule Take 1 Cap by mouth two (2) times a day.  doxazosin (CARDURA) 4 mg tablet Take 1 Tab by mouth nightly. For blood pressure    albuterol (PROVENTIL HFA, VENTOLIN HFA, PROAIR HFA) 90 mcg/actuation inhaler Take 2 Puffs by inhalation every four (4) hours as needed for Wheezing.  cyclobenzaprine (FLEXERIL) 10 mg tablet Take 1 Tab by mouth three (3) times daily as needed for Muscle Spasm(s).  BD INSULIN SYRINGE ULTRA-FINE 1 mL 31 gauge x 5/16 syrg TO USE AS DIRECTED TWICE A DAY    TATI PEN NEEDLE 32 gauge x 5/32\" ndle USE TO INJECT DAILY    clotrimazole-betamethasone (LOTRISONE) topical cream Apply  to affected area two (2) times a day.  ONE TOUCH ULTRA TEST strip USE TO TEST BLOOD SUGAR THREE TIMES A DAY AS DIRECTED    multivitamin (ONE A DAY) tablet Take 1 Tab by mouth daily.     cyanocobalamin (VITAMIN B-12) 500 mcg tablet Take 500 mcg by mouth daily. No current facility-administered medications for this visit. Allergies   Allergen Reactions    Augmentin [Amoxicillin-Pot Clavulanate] Hives    Penicillins Rash       Review of Systems:  - Eyes: see HPI  - Cardiovascular: no chest pain  - Respiratory: no shortness of breath  - Musculoskeletal: no myalgias  - Neurological: no numbness/tingling in extremities    Physical Examination:  Visit Vitals    /71    Pulse 81    Ht 5' 10\" (1.778 m)    Wt 261 lb (118.4 kg)    BMI 37.45 kg/m2   -   - General: pleasant, no distress, uses cane. HEENT: hearing intact, EOMI, clear sclera without icterus  - Cardiovascular: regular, normal rate   - Respiratory: normal effort  - Integumentary: no edema  - Psychiatric: normal mood and affect    Data Reviewed:   3/28/2017  Creatinine 3.32, GFR 23  Chol 131, HDL 31, LDL 78, triglycerides 109  Microalbumin/creatinine ratio 3885  A1c 10.5      Assessment/Plan:   1. Type 2 diabetes mellitus with complication, with long-term current use of insulin ([facility])   - Glucoses fairly well controlled for about 1 week earlier this month. Encouraged continued dietary efforts  - Increase AM dose to 70 units, lower lunchtime dose to 40 units and continuing dosing tonight. - encouraged closer consideration of how he eats affects his glucose level. 2. CKD (chronic kidney disease) stage 4, GFR 15-29 ml/min ([facility])   - reviewed importance of BP and diabetes control    3. HTN (hypertension), benign   - encouraged he eat out less to decrease sodium intake. - no changes to BP medications. Encouraged follow-up with nephrology     4. Dyslipidemia - continue at atorvastatin 40 mg   5. BMI 37.0-37.9, adult      Patient Instructions   Diabetes  Focus on what you are eating and drinking.       Novolog 70/30  70 units with breakfast , lunch: 40 units, PM dose  65 units    **Adjusting Novolog 70/30 for glucoses    < 100 - take 10 units less  201-250: add 5 units  251-300: add 10 units  301-350 :add 15 units  351 +    : add 20 units    Check blood sugars before meals and at bedtime    Goals for blood sugars:  Fasting  (less than 150)  Other times -  (less than 180). Blood pressure:  - follow with kidney specialist  Continue chlorthalidone 25 mg, lisinopril 40 mg, amlodipine to 5 mg daily, carvedilol to 25 mg twice daily, torsemide, doxazosin 8 mg. Cholesterol:   Continue atorvastatin 40 mg      Follow-up Disposition:  Return in about 3 months (around 7/4/2017).     Copy sent to:

## 2017-04-04 NOTE — MR AVS SNAPSHOT
Visit Information Date & Time Provider Department Dept. Phone Encounter #  
 4/4/2017  2:10 PM Shwetha Willis, [facility] Diabetes and Endocrinology [phone] Follow-up Instructions Return in about 3 months (around 7/4/2017). Your Appointments 7/24/2017  2:45 PM  
ROUTINE CARE with Hayden Marcano MD  
[facility]) Appt Note: 6 month follow up htn  
 [address] [facility] [address]  
  
   
 [address]  [address] [facility]  
  
    
 3/2/2018 11:00 AM  
Any with Alex Ahmadi MD  
[address] ([facility]) Appt Note: yearly follow up.  
 [address] [facility] [place] [id number] [address] [facility] [id number] Upcoming Health Maintenance Date Due  
 MEDICARE YEARLY EXAM 12/8/1974 Pneumococcal 19-64 Medium Risk (1 of 1 - PPSV23) 12/8/1975 ZOSTER VACCINE AGE 60> 12/8/2016 HEMOGLOBIN A1C Q6M 3/9/2017 LIPID PANEL Q1 11/30/2017 FOOT EXAM Q1 12/13/2017 EYE EXAM RETINAL OR DILATED Q1 1/27/2018 COLONOSCOPY 3/20/2022 DTaP/Tdap/Td series (3 - Td) 11/11/2025 Allergies as of 4/4/2017  Review Complete On: 4/4/2017 By: Shwetha Willis MD  
  
 Severity Noted Reaction Type Reactions Augmentin [Amoxicillin-pot Clavulanate]  07/08/2013    Hives Penicillins  02/21/2014    Rash Current Immunizations  Reviewed on 1/24/2017 Name Date Influenza Vaccine 11/15/2016, 9/11/2015 Influenza Vaccine PF 9/12/2014 10:55 AM  
 Influenza Vaccine Split 10/26/2012 Pneumococcal Conjugate (PCV-13) 9/8/2016 TDAP Vaccine 10/26/2012 Tdap 11/11/2015 Not reviewed this visit Vitals BP Pulse Height(growth percentile) Weight(growth percentile) BMI Smoking Status  159/71 81 5' 10\" (1.778 m) 261 lb (118.4 kg) 37.45 kg/m2 Current Every Day Smoker Vitals History BMI and BSA Data Body Mass Index Body Surface Area  
 37.45 kg/m 2 2.42 m 2 Preferred Pharmacy Pharmacy Name Phone [id number], [address] [phone] Your Updated Medication List  
  
   
This list is accurate as of: 4/4/17  3:04 PM.  Always use your most recent med list.  
  
  
  
  
 acetaminophen-codeine 300-30 mg per tablet Commonly known as:  TYLENOL #3  
TAKE 1 TABLET BY MOUTH EVERY 6 HOURS  
  
 albuterol 90 mcg/actuation inhaler Commonly known as:  PROVENTIL HFA, VENTOLIN HFA, PROAIR HFA Take 2 Puffs by inhalation every four (4) hours as needed for Wheezing. amLODIPine 10 mg tablet Commonly known as:  Sharion Pomona Take 1 Tab by mouth daily. aspirin-dipyridamole  mg per SR capsule Commonly known as:  AGGRENOX Take 1 Cap by mouth two (2) times a day. atorvastatin 20 mg tablet Commonly known as:  LIPITOR  
TAKE 1 TABLET BY MOUTH DAILY  
  
 BD INSULIN SYRINGE ULTRA-FINE 1 mL 31 gauge x 5/16 Syrg Generic drug:  Insulin Syringe-Needle U-100 TO USE AS DIRECTED TWICE A DAY  
  
 carvedilol 25 mg tablet Commonly known as:  Shyla Anamosa Take 1 Tab by mouth two (2) times daily (with meals). chlorthalidone 25 mg tablet Commonly known as:  Thressa Kobs Take 1 Tab by mouth daily. clindamycin 300 mg capsule Commonly known as:  CLEOCIN  
TAKE ONE CAPSULE BY MOUTH 4 TIMES A DAY clotrimazole-betamethasone topical cream  
Commonly known as:  Dorothey Cee Apply  to affected area two (2) times a day. cyclobenzaprine 10 mg tablet Commonly known as:  FLEXERIL Take 1 Tab by mouth three (3) times daily as needed for Muscle Spasm(s). doxazosin 8 mg tablet Commonly known as:  CARDURA Take  by mouth daily. FISH OIL 1,000 mg (120 mg-180 mg) Cap Generic drug:  Omega-3-DHA-EPA-Fish Oil Take  by mouth. insulin NPH/insulin regular 100 unit/mL (70-30) injection Commonly known as:  NOVOLIN 70/30, HUMULIN 70/30 INJECT 65 UNITS SUBCUTANEOUSLY WITH BREAKFAST, 45 UNITS WITH LUNCH, 65 UNITS WITH DINNER + ADDITIONAL FOR HIGH GLUCOSES- UP  UNITS/DAY lisinopril 40 mg tablet Commonly known as:  Walker Ku Take 1 Tab by mouth daily. multivitamin tablet Commonly known as:  ONE A DAY Take 1 Tab by mouth daily. Kajal Pen Needle 32 gauge x 5/32\" Ndle Generic drug:  Insulin Needles (Disposable) USE TO INJECT DAILY  
  
 ONETOUCH ULTRA TEST strip Generic drug:  glucose blood VI test strips USE TO TEST BLOOD SUGAR THREE TIMES A DAY AS DIRECTED  
  
 torsemide 20 mg tablet Commonly known as:  DEMADEX Take 1 Tab by mouth daily. For blood pressure and fluid VICTOZA 2-UNIQUE SC  
by SubCUTAneous route. VITAMIN B-12 500 mcg tablet Generic drug:  cyanocobalamin Take 500 mcg by mouth daily. Follow-up Instructions Return in about 3 months (around 7/4/2017). Patient Instructions Diabetes Focus on what you are eating and drinking. Novolog 70/30 
70 units with breakfast , lunch: 40 units, PM dose  65 units **Adjusting Novolog 70/30 for glucoses < 100 - take 10 units less 201-250: add 5 units 251-300: add 10 units 301-350 :add 15 units 351 +    : add 20 units Check blood sugars before meals and at bedtime Goals for blood sugars: 
Fasting  (less than 150) Other times -  (less than 180). Blood pressure: 
- follow with kidney specialist 
Continue chlorthalidone 25 mg, lisinopril 40 mg, amlodipine to 5 mg daily, carvedilol to 25 mg twice daily, torsemide, doxazosin 8 mg. Cholesterol:  
Continue atorvastatin 40 mg Introducing [facility]! Clarita Lees introduces [employer] patient portal. Now you can access parts of your medical record, email your doctor's office, and request medication refills online. 1. In your internet browser, go to [email or web address] 2. Click on the First Time User? Click Here link in the Sign In box. You will see the New Member Sign Up page. 3. Enter your Ox[employer] Access Code exactly as it appears below. You will not need to use this code after youve completed the sign-up process. If you do not sign up before the expiration date, you must request a new code. · [employer] Access Code: [email or web address] Expires: 5/31/2017 12:29 PM 
 
4. Enter the last four digits of your Social Security Number (xxxx) and Date of Birth (mm/dd/yyyy) as indicated and click Submit. You will be taken to the next sign-up page. 5. Create a NPRt ID. This will be your Oxxy login ID and cannot be changed, so think of one that is secure and easy to remember. 6. Create a [employer] password. You can change your password at any time. 7. Enter your Password Reset Question and Answer. This can be used at a later time if you forget your password. 8. Enter your e-mail address. You will receive e-mail notification when new information is available in [address] 9. Click Sign Up. You can now view and download portions of your medical record. 10. Click the Download Summary menu link to download a portable copy of your medical information. If you have questions, please visit the Frequently Asked Questions section of the [employer] website. Remember, [employer] is NOT to be used for urgent needs. For medical emergencies, dial 911. Now available from your iPhone and Android! Please provide this summary of care documentation to your next provider. Your primary care clinician is listed as [place]. If you have any questions after today's visit, please call [phone].

## 2017-11-02 NOTE — TELEPHONE ENCOUNTER
11/2/2017  3:34 PM        Honey would like Mr. Ania Hughes A1c results for this year please fax to [phone]        Thanks

## 2017-12-07 NOTE — TELEPHONE ENCOUNTER
Mr. Connie Arredondo passed away at home. He has had issues with blood sugar. \"He had been incarcerated for a while, and lost a lot of weight. \"    Was just released from the hospital at [facility] for hypoglycemia, and was \"continuing to struggle with low sugars. \"  Yesterday, ranged 41 - 150. His friend, Ms. Rosalia Neves, hadn't heard from him in a while, and tried to contact him. He was found in bed, unresponsive with faint pulse, and was coded in the field by EMS. They were not able to successfully revive him. Sounds very plausible that he  of hypoglycemia.

## 2017-12-07 NOTE — TELEPHONE ENCOUNTER
Officer Ezequiel calling requesting to speak with Dr. Roberta Weston regarding a death certificated for patient. Dr. Roberta Weston was given the number to contact patient.

## 2017-12-13 ENCOUNTER — TELEPHONE (OUTPATIENT)
Dept: INTERNAL MEDICINE CLINIC | Age: 62
End: 2017-12-13

## 2017-12-13 NOTE — TELEPHONE ENCOUNTER
Ismael Gates [facility], requested call back to confirm whether Dr. Radha Meyers would sign Pt death certificate since Pt has passed. Would need confirmation to bring over document to sign.      X:[phone]       Message received & copied from [facility]

## 2017-12-14 NOTE — TELEPHONE ENCOUNTER
Spoke with [facility] she wanted to confirm if Dr. Barrie Buitrago would sign  Death Certificate.

## 2021-09-22 NOTE — TELEPHONE ENCOUNTER
Return for an appointment for Return as scheduled with Dr Cathryn Lake with Dr. Renetta Watson. Rosa, you might reach out and see what the question is.  BJF